# Patient Record
Sex: FEMALE | Race: WHITE | NOT HISPANIC OR LATINO | URBAN - METROPOLITAN AREA
[De-identification: names, ages, dates, MRNs, and addresses within clinical notes are randomized per-mention and may not be internally consistent; named-entity substitution may affect disease eponyms.]

---

## 2019-11-27 ENCOUNTER — OFFICE VISIT (OUTPATIENT)
Dept: FAMILY MEDICINE CLINIC | Facility: CLINIC | Age: 34
End: 2019-11-27
Payer: COMMERCIAL

## 2019-11-27 VITALS
RESPIRATION RATE: 20 BRPM | BODY MASS INDEX: 24.11 KG/M2 | HEART RATE: 86 BPM | HEIGHT: 66 IN | DIASTOLIC BLOOD PRESSURE: 70 MMHG | WEIGHT: 150 LBS | SYSTOLIC BLOOD PRESSURE: 106 MMHG | OXYGEN SATURATION: 99 % | TEMPERATURE: 97.9 F

## 2019-11-27 DIAGNOSIS — Z23 NEEDS FLU SHOT: ICD-10-CM

## 2019-11-27 DIAGNOSIS — Z00.00 ANNUAL PHYSICAL EXAM: Primary | ICD-10-CM

## 2019-11-27 PROCEDURE — 90686 IIV4 VACC NO PRSV 0.5 ML IM: CPT

## 2019-11-27 PROCEDURE — 90471 IMMUNIZATION ADMIN: CPT

## 2019-11-27 PROCEDURE — 99385 PREV VISIT NEW AGE 18-39: CPT | Performed by: NURSE PRACTITIONER

## 2019-11-27 NOTE — PROGRESS NOTES
HPI:  Danilo Burgos is a 29 y o  female here for her yearly health maintenance exam    There is no problem list on file for this patient  History reviewed  No pertinent past medical history  1  Advanced Directive: n/a     2  Durable Power of  for Healthcare: n/a     3  Social History:               Marital History:               Work Status: stay at home,               Drug and alcohol History: none              Alcohol Use: social     4  General Health: good health              Regular Dental Visits:yes              Vision problems:none              Hearing Loss:none              Immunizations up to date: UTD                 Lifestyle:                           Healthy Diet:yes                          Tobacco Use:none                          Regular exercise:yes                          Weight concerns:none                           PHQ-9 Depression Screening    PHQ-9:    Frequency of the following problems over the past two weeks:       Little interest or pleasure in doing things:  0 - not at all  Feeling down, depressed, or hopeless:  0 - not at all  PHQ-2 Score:  0           No current outpatient medications on file  No current facility-administered medications for this visit  No Known Allergies  Immunization History   Administered Date(s) Administered    DTaP 5 07/27/2014    INFLUENZA 11/29/2007, 10/06/2010, 12/07/2011, 11/26/2014, 10/05/2016, 09/27/2017, 11/04/2018    Influenza, injectable, quadrivalent, preservative free 0 5 mL 11/27/2019    Tdap 03/01/2008, 11/04/2010, 09/13/2017, 07/22/2019       Patient Care Team:  Malka Bosworth as PCP - General (Family Medicine)    Review of Systems   Constitutional: Negative for activity change, diaphoresis, fatigue and fever  HENT: Negative for congestion, facial swelling, hearing loss, rhinorrhea, sinus pressure, sinus pain, sneezing, sore throat and voice change      Eyes: Negative for discharge and visual disturbance  Respiratory: Negative for cough, choking, chest tightness, shortness of breath, wheezing and stridor  Cardiovascular: Negative for chest pain, palpitations and leg swelling  Gastrointestinal: Negative for abdominal distention, abdominal pain, constipation, diarrhea, nausea and vomiting  Endocrine: Negative for polydipsia, polyphagia and polyuria  Genitourinary: Negative for difficulty urinating, dysuria, frequency and urgency  Musculoskeletal: Negative for arthralgias, back pain, gait problem, joint swelling, myalgias, neck pain and neck stiffness  Skin: Negative for color change, rash and wound  Neurological: Negative for dizziness, syncope, speech difficulty, weakness, light-headedness and headaches  Hematological: Negative for adenopathy  Does not bruise/bleed easily  Psychiatric/Behavioral: Negative for agitation, behavioral problems, confusion, hallucinations, sleep disturbance and suicidal ideas  The patient is not nervous/anxious  Physical Exam :  Physical Exam   Constitutional: She is oriented to person, place, and time  She appears well-developed and well-nourished  No distress  HENT:   Head: Normocephalic and atraumatic  Right Ear: External ear normal    Left Ear: External ear normal    Nose: Nose normal    Mouth/Throat: Uvula is midline, oropharynx is clear and moist and mucous membranes are normal    Eyes: Pupils are equal, round, and reactive to light  Conjunctivae and EOM are normal  Right eye exhibits no discharge  Left eye exhibits no discharge  Neck: Normal range of motion  Cardiovascular: Normal rate, regular rhythm and normal heart sounds  Exam reveals no gallop and no friction rub  No murmur heard  Pulmonary/Chest: Effort normal and breath sounds normal  No respiratory distress  She has no wheezes  She has no rales  Musculoskeletal: Normal range of motion  She exhibits no edema, tenderness or deformity     Neurological: She is alert and oriented to person, place, and time  No cranial nerve deficit  Coordination normal    Skin: Skin is warm and dry  No rash noted  She is not diaphoretic  No erythema  Psychiatric: She has a normal mood and affect  Her behavior is normal  Judgment and thought content normal    Nursing note and vitals reviewed  Reviewed Updated [facility] Visits:   Last Health Maintenance visit information was reviewed, patient interviewed , no change since last HM visit no  Last HM visit information was reviewed, patient interviewed and updates made to the record today no    Assessment and Plan:  1  Annual physical exam     2   Needs flu shot  influenza vaccine, [id number], quadrivalent, 0 5 mL, preservative-free, for adult and pediatric patients 6 mos+ (AFLURIA, FLUARIX, FLULAVAL, FLUZONE)       Health Maintenance Due   Topic Date Due    Depression Screening PHQ  1985    HIV Screening  07/30/2000    BMI: Adult  07/30/2003    Cervical Cancer Screening  07/30/2006    Influenza Vaccine  07/01/2019      PHQ-9 Depression Screening    PHQ-9:    Frequency of the following problems over the past two weeks:       Little interest or pleasure in doing things:  0 - not at all  Feeling down, depressed, or hopeless:  0 - not at all  PHQ-2 Score:  0

## 2020-10-26 DIAGNOSIS — Z20.828 EXPOSURE TO SARS-ASSOCIATED CORONAVIRUS: ICD-10-CM

## 2020-10-26 DIAGNOSIS — Z20.828 EXPOSURE TO SARS-ASSOCIATED CORONAVIRUS: Primary | ICD-10-CM

## 2020-10-26 PROCEDURE — U0003 INFECTIOUS AGENT DETECTION BY NUCLEIC ACID (DNA OR RNA); SEVERE ACUTE RESPIRATORY SYNDROME CORONAVIRUS 2 (SARS-COV-2) (CORONAVIRUS DISEASE [COVID-19]), AMPLIFIED PROBE TECHNIQUE, MAKING USE OF HIGH THROUGHPUT TECHNOLOGIES AS DESCRIBED BY CMS-2020-01-R: HCPCS | Performed by: NURSE PRACTITIONER

## 2020-10-28 LAB — SARS-COV-2 RNA SPEC QL NAA+PROBE: NOT DETECTED

## 2021-06-24 ENCOUNTER — TELEPHONE (OUTPATIENT)
Dept: FAMILY MEDICINE CLINIC | Facility: CLINIC | Age: 36
End: 2021-06-24

## 2021-10-15 DIAGNOSIS — Z20.822 EXPOSURE TO COVID-19 VIRUS: Primary | ICD-10-CM

## 2021-10-15 PROCEDURE — U0003 INFECTIOUS AGENT DETECTION BY NUCLEIC ACID (DNA OR RNA); SEVERE ACUTE RESPIRATORY SYNDROME CORONAVIRUS 2 (SARS-COV-2) (CORONAVIRUS DISEASE [COVID-19]), AMPLIFIED PROBE TECHNIQUE, MAKING USE OF HIGH THROUGHPUT TECHNOLOGIES AS DESCRIBED BY CMS-2020-01-R: HCPCS | Performed by: NURSE PRACTITIONER

## 2021-10-15 PROCEDURE — U0005 INFEC AGEN DETEC AMPLI PROBE: HCPCS | Performed by: NURSE PRACTITIONER

## 2021-10-16 LAB — SARS-COV-2 RNA RESP QL NAA+PROBE: NEGATIVE

## 2022-08-26 ENCOUNTER — OFFICE VISIT (OUTPATIENT)
Dept: FAMILY MEDICINE CLINIC | Facility: CLINIC | Age: 37
End: 2022-08-26
Payer: COMMERCIAL

## 2022-08-26 VITALS
BODY MASS INDEX: 26.97 KG/M2 | TEMPERATURE: 99.2 F | DIASTOLIC BLOOD PRESSURE: 72 MMHG | SYSTOLIC BLOOD PRESSURE: 116 MMHG | HEART RATE: 79 BPM | OXYGEN SATURATION: 99 % | WEIGHT: 167.8 LBS | HEIGHT: 66 IN | RESPIRATION RATE: 20 BRPM

## 2022-08-26 DIAGNOSIS — Z00.00 ANNUAL PHYSICAL EXAM: Primary | ICD-10-CM

## 2022-08-26 DIAGNOSIS — R53.83 OTHER FATIGUE: ICD-10-CM

## 2022-08-26 DIAGNOSIS — E66.3 OVERWEIGHT WITH BODY MASS INDEX (BMI) OF 27 TO 27.9 IN ADULT: ICD-10-CM

## 2022-08-26 DIAGNOSIS — Z13.220 SCREENING CHOLESTEROL LEVEL: ICD-10-CM

## 2022-08-26 PROCEDURE — 3725F SCREEN DEPRESSION PERFORMED: CPT | Performed by: NURSE PRACTITIONER

## 2022-08-26 PROCEDURE — 99395 PREV VISIT EST AGE 18-39: CPT | Performed by: NURSE PRACTITIONER

## 2022-08-26 NOTE — PROGRESS NOTES
HPI:  Frida Solano is a 40 y o  female here for her yearly health maintenance exam    There is no problem list on file for this patient  History reviewed  No pertinent past medical history  1  Advanced Directive: na     2  Durable Power of  for Healthcare: na     3  Social History:               Marital History: - [place]              Work Status:               Drug and alcohol History: no              Alcohol Use: monthly     4  General Health: good              Regular Dental Visits:yes              Vision problems:none              Hearing Loss:none              Immunizations up to date: UTD                 Lifestyle:                           Healthy Diet:limited junk food                          [address]                          Regular exercise:tries to get cardio 3x/week                                PHQ-2/9 Depression Screening    Little interest or pleasure in doing things: 0 - not at all  Feeling down, depressed, or hopeless: 0 - not at all  PHQ-2 Score: 0  PHQ-2 Interpretation: Negative depression screen           No current outpatient medications on file  No current facility-administered medications for this visit  No Known Allergies  Immunization History   Administered Date(s) Administered    COVID-19 PFIZER VACCINE 0 3 ML IM 04/28/2021, 05/19/2021    DTaP 5 07/27/2014    INFLUENZA 11/29/2007, 10/06/2010, 12/07/2011, 11/26/2014, 10/05/2016, 09/27/2017, 11/04/2018, 11/27/2019, 10/24/2020    Influenza, injectable, quadrivalent, preservative free 0 5 mL 11/27/2019    Tdap 03/01/2008, 11/04/2010, 07/03/2017, 09/13/2017, 07/22/2019       Patient Care Team:  Heraclio Bradford as PCP - General (Family Medicine)  Heraclio Bradford as PCP - [facility]CP-[facility] Attributed-Roster    Review of Systems   Constitutional: Positive for activity change, fatigue and unexpected weight change  Negative for diaphoresis and fever     HENT: Negative for congestion, facial swelling, hearing loss, rhinorrhea, sinus pressure, sinus pain, sneezing, sore throat and voice change  Eyes: Negative for discharge and visual disturbance  Respiratory: Negative for cough, choking, chest tightness, shortness of breath, wheezing and stridor  Cardiovascular: Negative for chest pain, palpitations and leg swelling  Gastrointestinal: Negative for abdominal distention, abdominal pain, constipation, diarrhea, nausea and vomiting  Endocrine: Negative for polydipsia, polyphagia and polyuria  Genitourinary: Negative for difficulty urinating, dysuria, frequency and urgency  Musculoskeletal: Negative for arthralgias, back pain, gait problem, joint swelling, myalgias, neck pain and neck stiffness  Skin: Negative for color change, rash and wound  Neurological: Negative for dizziness, syncope, speech difficulty, weakness, light-headedness and headaches  Hematological: Negative for adenopathy  Does not bruise/bleed easily  Psychiatric/Behavioral: Negative for agitation, behavioral problems, confusion, hallucinations, sleep disturbance and suicidal ideas  The patient is not nervous/anxious  Physical Exam :  Physical Exam  Vitals and nursing note reviewed  Constitutional:       General: She is not in acute distress  Appearance: She is well-developed  She is not diaphoretic  HENT:      Head: Normocephalic and atraumatic  Right Ear: Tympanic membrane, ear canal and external ear normal       Left Ear: Tympanic membrane, ear canal and external ear normal       Nose: Nose normal       Right Sinus: No maxillary sinus tenderness or frontal sinus tenderness  Left Sinus: No maxillary sinus tenderness or frontal sinus tenderness  Mouth/Throat:      Mouth: Mucous membranes are moist       Pharynx: Oropharynx is clear  Uvula midline  No oropharyngeal exudate  Eyes:      General:         Right eye: No discharge  Left eye: No discharge  Conjunctiva/sclera: Conjunctivae normal       Pupils: Pupils are equal, round, and reactive to light  Neck:      Thyroid: No thyromegaly  Trachea: No tracheal deviation  Cardiovascular:      Rate and Rhythm: Normal rate and regular rhythm  Heart sounds: Normal heart sounds  No murmur heard  No friction rub  No gallop  Pulmonary:      Effort: Pulmonary effort is normal  No respiratory distress  Breath sounds: Normal breath sounds  No wheezing or rales  Abdominal:      General: Bowel sounds are normal  There is no distension  Palpations: Abdomen is soft  There is no mass  Tenderness: There is no abdominal tenderness  There is no guarding or rebound  Musculoskeletal:         General: No tenderness or deformity  Normal range of motion  Cervical back: Normal range of motion and neck supple  Lymphadenopathy:      Cervical: No cervical adenopathy  Skin:     General: Skin is warm and dry  Findings: No erythema or rash  Neurological:      Mental Status: She is alert and oriented to person, place, and time  Cranial Nerves: No cranial nerve deficit  Coordination: Coordination normal    Psychiatric:         Mood and Affect: Mood normal          Speech: Speech normal          Behavior: Behavior normal  Behavior is cooperative  Thought Content: Thought content normal          Judgment: Judgment normal        BMI Counseling: Body mass index is 27 08 kg/m²  The BMI is above normal  Nutrition recommendations include decreasing portion sizes, encouraging healthy choices of fruits and vegetables, decreasing fast food intake, consuming healthier snacks, limiting drinks that contain sugar, moderation in carbohydrate intake and increasing intake of lean protein  Exercise recommendations include exercising 3-5 times per week  Rationale for BMI follow-up plan is due to patient being overweight or obese       Depression Screening and Follow-up Plan: Patient was screened for depression during today's encounter  They screened negative with a PHQ-2 score of 0  Reviewed Updated [facility] Visits:   Last Health Maintenance visit information was reviewed, patient interviewed , no change since last HM visit yes  Last HM visit information was reviewed, patient interviewed and updates made to the record today yes    Assessment and Plan:  1  Annual physical exam  CBC and differential    Comprehensive metabolic panel   2  Other fatigue  Iron Panel (Includes Ferritin, Iron Sat%, Iron, and TIBC)    DHEA    Cortisol Level, AM Specimen   3  Screening cholesterol level  Lipid panel   4   Overweight with body mass index (BMI) of 27 to 27 9 in adult         Health Maintenance Due   Topic Date Due    Hepatitis C Screening  Never done    HIV Screening  Never done    BMI: Followup Plan  Never done    Cervical Cancer Screening  Never done    Annual Physical  11/27/2020    COVID-19 Vaccine (3 - Booster for Pfizer series) 10/19/2021    Influenza Vaccine (1) 09/01/2022

## 2023-09-11 ENCOUNTER — OFFICE VISIT (OUTPATIENT)
Dept: FAMILY MEDICINE CLINIC | Facility: CLINIC | Age: 38
End: 2023-09-11
Payer: COMMERCIAL

## 2023-09-11 VITALS
SYSTOLIC BLOOD PRESSURE: 118 MMHG | WEIGHT: 164 LBS | BODY MASS INDEX: 26.36 KG/M2 | TEMPERATURE: 98 F | RESPIRATION RATE: 20 BRPM | DIASTOLIC BLOOD PRESSURE: 70 MMHG | HEART RATE: 88 BPM | HEIGHT: 66 IN | OXYGEN SATURATION: 99 %

## 2023-09-11 DIAGNOSIS — Z00.00 ANNUAL PHYSICAL EXAM: Primary | ICD-10-CM

## 2023-09-11 DIAGNOSIS — Z13.220 SCREENING CHOLESTEROL LEVEL: ICD-10-CM

## 2023-09-11 PROCEDURE — 99395 PREV VISIT EST AGE 18-39: CPT | Performed by: NURSE PRACTITIONER

## 2023-09-11 NOTE — PROGRESS NOTES
HPI:  Jurgen Agrawal is a 45 y.o. female here for her yearly health maintenance exam.   There is no problem list on file for this patient. No past medical history on file. 1. Advanced Directive: n/a     2. Durable Power of  for Healthcare: n/a     3. 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 General Health: good              Regular Dental Visits: regular               Vision problems:none              Hearing Loss: none              Immunizations up to date: up to date                  Lifestyle:                           Healthy Diet: healthy eating                           Tobacco Use:none                          Regular exercise: running after kids                           Weight concerns: none        PHQ-2/9 Depression Screening    Little interest or pleasure in doing things: 0 - not at all  Feeling down, depressed, or hopeless: 0 - not at all  PHQ-2 Score: 0  PHQ-2 Interpretation: Negative depression screen           No current outpatient medications on file. No current facility-administered medications for this visit. No Known Allergies  Immunization History   Administered Date(s) Administered   • COVID-19 PFIZER VACCINE 0.3 ML IM 04/28/2021, 05/19/2021   • DTaP 5 07/27/2014   • INFLUENZA 11/29/2007, 10/06/2010, 12/07/2011, 11/26/2014, 10/05/2016, 09/27/2017, 11/04/2018, 11/27/2019, 10/24/2020   • Influenza, injectable, quadrivalent, preservative free 0.5 mL 11/27/2019   • Tdap 03/01/2008, 11/04/2010, 07/03/2017, 09/13/2017, 07/22/2019       Patient Care Team:  Judy Quan as PCP - General (Family Medicine)  Judy Quan as PCP - PCP-[facility] Attributed-Roster    Review of Systems   Constitutional: Negative for activity change, diaphoresis, fatigue and fever.    HENT: Negative for congestion, facial swelling, hearing loss, rhinorrhea, sinus pressure, sinus pain, sneezing, sore throat and voice change. Eyes: Negative for discharge and visual disturbance. Respiratory: Negative for cough, choking, chest tightness, shortness of breath, wheezing and stridor. Cardiovascular: Negative for chest pain, palpitations and leg swelling. Gastrointestinal: Negative for abdominal distention, abdominal pain, constipation, diarrhea, nausea and vomiting. Endocrine: Negative for polydipsia, polyphagia and polyuria. Genitourinary: Negative for difficulty urinating, dysuria, frequency and urgency. Musculoskeletal: Negative for arthralgias, back pain, gait problem, joint swelling, myalgias, neck pain and neck stiffness. Skin: Negative for color change, rash and wound. Neurological: Negative for dizziness, syncope, speech difficulty, weakness, light-headedness and headaches. Hematological: Negative for adenopathy. Does not bruise/bleed easily. Psychiatric/Behavioral: Negative for agitation, behavioral problems, confusion, hallucinations, sleep disturbance and suicidal ideas. The patient is not nervous/anxious. Physical Exam :  Physical Exam  Vitals and nursing note reviewed. Constitutional:       General: She is not in acute distress. Appearance: Normal appearance. She is well-developed. She is not diaphoretic. HENT:      Head: Normocephalic and atraumatic. Right Ear: Tympanic membrane, ear canal and external ear normal.      Left Ear: Tympanic membrane, ear canal and external ear normal.      Nose: Nose normal.      Right Sinus: No maxillary sinus tenderness or frontal sinus tenderness. Left Sinus: No maxillary sinus tenderness or frontal sinus tenderness. Mouth/Throat:      Mouth: Mucous membranes are moist.      Pharynx: Oropharynx is clear. Uvula midline. No oropharyngeal exudate. Eyes:      General:         Right eye: No discharge. Left eye: No discharge. Conjunctiva/sclera: Conjunctivae normal.      Pupils: Pupils are equal, round, and reactive to light. Neck:      Thyroid: No thyromegaly. Trachea: No tracheal deviation. Cardiovascular:      Rate and Rhythm: Normal rate and regular rhythm. Heart sounds: Normal heart sounds. No murmur heard. No friction rub. No gallop. Pulmonary:      Effort: Pulmonary effort is normal. No respiratory distress. Breath sounds: Normal breath sounds. No wheezing or rales. Abdominal:      General: Bowel sounds are normal. There is no distension. Palpations: Abdomen is soft. There is no mass. Tenderness: There is no abdominal tenderness. There is no guarding or rebound. Musculoskeletal:         General: No tenderness or deformity. Normal range of motion. Cervical back: Normal range of motion and neck supple. Lymphadenopathy:      Cervical: No cervical adenopathy. Skin:     General: Skin is warm and dry. Findings: No erythema or rash. Neurological:      Mental Status: She is alert and oriented to person, place, and time. Cranial Nerves: No cranial nerve deficit. Coordination: Coordination normal.   Psychiatric:         Mood and Affect: Mood normal.         Speech: Speech normal.         Behavior: Behavior normal.         Thought Content: Thought content normal.         Judgment: Judgment normal.       BMI Counseling: Body mass index is 26.47 kg/m². The BMI is above normal. Nutrition recommendations include decreasing portion sizes, encouraging healthy choices of fruits and vegetables, decreasing fast food intake, consuming healthier snacks, limiting drinks that contain sugar, moderation in carbohydrate intake and increasing intake of lean protein. Exercise recommendations include exercising 3-5 times per week. Rationale for BMI follow-up plan is due to patient being overweight or obese.      Depression Screening and Follow-up Plan: Patient was screened for depression during today's encounter. They screened negative with a PHQ-2 score of 0. Reviewed Updated [facility] Visits:   Last Health Maintenance visit information was reviewed, patient interviewed , no change since last HM visit yes  Last HM visit information was reviewed, patient interviewed and updates made to the record today yes    Assessment and Plan:  1. Annual physical exam  CBC and differential    Comprehensive metabolic panel    TSH, 3rd generation with Free T4 reflex      2.  Screening cholesterol level  Lipid panel          Health Maintenance Due   Topic Date Due   • Hepatitis C Screening  Never done   • HIV Screening  Never done   • Cervical Cancer Screening  Never done   • COVID-19 Vaccine (3 - Pfizer series) 07/14/2021   • BMI: Followup Plan  08/26/2023   • Annual Physical  08/26/2023   • Influenza Vaccine (1) 09/01/2023

## 2024-03-29 ENCOUNTER — APPOINTMENT (OUTPATIENT)
Dept: LAB | Facility: CLINIC | Age: 39
End: 2024-03-29
Payer: COMMERCIAL

## 2024-03-29 DIAGNOSIS — Z00.00 ANNUAL PHYSICAL EXAM: ICD-10-CM

## 2024-03-29 DIAGNOSIS — Z13.220 SCREENING CHOLESTEROL LEVEL: ICD-10-CM

## 2024-03-29 LAB
ALBUMIN SERPL BCP-MCNC: 4.1 G/DL (ref 3.5–5)
ALP SERPL-CCNC: 46 U/L (ref 34–104)
ALT SERPL W P-5'-P-CCNC: 9 U/L (ref 7–52)
ANION GAP SERPL CALCULATED.3IONS-SCNC: 9 MMOL/L (ref 4–13)
AST SERPL W P-5'-P-CCNC: 15 U/L (ref 13–39)
BASOPHILS # BLD AUTO: 0.09 THOUSANDS/ÂΜL (ref 0–0.1)
BASOPHILS NFR BLD AUTO: 1 % (ref 0–1)
BILIRUB SERPL-MCNC: 0.47 MG/DL (ref 0.2–1)
BUN SERPL-MCNC: 9 MG/DL (ref 5–25)
CALCIUM SERPL-MCNC: 8.7 MG/DL (ref 8.4–10.2)
CHLORIDE SERPL-SCNC: 103 MMOL/L (ref 96–108)
CHOLEST SERPL-MCNC: 169 MG/DL
CO2 SERPL-SCNC: 26 MMOL/L (ref 21–32)
CREAT SERPL-MCNC: 0.63 MG/DL (ref 0.6–1.3)
EOSINOPHIL # BLD AUTO: 0.2 THOUSAND/ÂΜL (ref 0–0.61)
EOSINOPHIL NFR BLD AUTO: 3 % (ref 0–6)
ERYTHROCYTE [DISTWIDTH] IN BLOOD BY AUTOMATED COUNT: 12.5 % (ref 11.6–15.1)
GFR SERPL CREATININE-BSD FRML MDRD: 114 ML/MIN/1.73SQ M
GLUCOSE P FAST SERPL-MCNC: 88 MG/DL (ref 65–99)
HCT VFR BLD AUTO: 39.1 % (ref 34.8–46.1)
HDLC SERPL-MCNC: 64 MG/DL
HGB BLD-MCNC: 12.8 G/DL (ref 11.5–15.4)
IMM GRANULOCYTES # BLD AUTO: 0.02 THOUSAND/UL (ref 0–0.2)
IMM GRANULOCYTES NFR BLD AUTO: 0 % (ref 0–2)
LDLC SERPL CALC-MCNC: 96 MG/DL (ref 0–100)
LYMPHOCYTES # BLD AUTO: 2.64 THOUSANDS/ÂΜL (ref 0.6–4.47)
LYMPHOCYTES NFR BLD AUTO: 36 % (ref 14–44)
MCH RBC QN AUTO: 31.1 PG (ref 26.8–34.3)
MCHC RBC AUTO-ENTMCNC: 32.7 G/DL (ref 31.4–37.4)
MCV RBC AUTO: 95 FL (ref 82–98)
MONOCYTES # BLD AUTO: 0.48 THOUSAND/ÂΜL (ref 0.17–1.22)
MONOCYTES NFR BLD AUTO: 7 % (ref 4–12)
NEUTROPHILS # BLD AUTO: 3.97 THOUSANDS/ÂΜL (ref 1.85–7.62)
NEUTS SEG NFR BLD AUTO: 53 % (ref 43–75)
NONHDLC SERPL-MCNC: 105 MG/DL
NRBC BLD AUTO-RTO: 0 /100 WBCS
PLATELET # BLD AUTO: 305 THOUSANDS/UL (ref 149–390)
PMV BLD AUTO: 10.8 FL (ref 8.9–12.7)
POTASSIUM SERPL-SCNC: 4.3 MMOL/L (ref 3.5–5.3)
PROT SERPL-MCNC: 6.6 G/DL (ref 6.4–8.4)
RBC # BLD AUTO: 4.11 MILLION/UL (ref 3.81–5.12)
SODIUM SERPL-SCNC: 138 MMOL/L (ref 135–147)
TRIGL SERPL-MCNC: 47 MG/DL
TSH SERPL DL<=0.05 MIU/L-ACNC: 1.01 UIU/ML (ref 0.45–4.5)
WBC # BLD AUTO: 7.4 THOUSAND/UL (ref 4.31–10.16)

## 2024-03-29 PROCEDURE — 80061 LIPID PANEL: CPT

## 2024-03-29 PROCEDURE — 36415 COLL VENOUS BLD VENIPUNCTURE: CPT

## 2024-03-29 PROCEDURE — 80053 COMPREHEN METABOLIC PANEL: CPT

## 2024-03-29 PROCEDURE — 85025 COMPLETE CBC W/AUTO DIFF WBC: CPT

## 2024-03-29 PROCEDURE — 84443 ASSAY THYROID STIM HORMONE: CPT

## 2024-05-16 ENCOUNTER — OFFICE VISIT (OUTPATIENT)
Dept: FAMILY MEDICINE CLINIC | Facility: CLINIC | Age: 39
End: 2024-05-16
Payer: COMMERCIAL

## 2024-05-16 VITALS
BODY MASS INDEX: 25.99 KG/M2 | OXYGEN SATURATION: 98 % | HEART RATE: 94 BPM | WEIGHT: 161 LBS | SYSTOLIC BLOOD PRESSURE: 122 MMHG | TEMPERATURE: 98.1 F | DIASTOLIC BLOOD PRESSURE: 80 MMHG

## 2024-05-16 DIAGNOSIS — J01.11 ACUTE RECURRENT FRONTAL SINUSITIS: Primary | ICD-10-CM

## 2024-05-16 PROCEDURE — 99213 OFFICE O/P EST LOW 20 MIN: CPT | Performed by: NURSE PRACTITIONER

## 2024-05-16 RX ORDER — FLUTICASONE PROPIONATE 50 MCG
1 SPRAY, SUSPENSION (ML) NASAL AS NEEDED
COMMUNITY

## 2024-05-16 RX ORDER — PREDNISONE 20 MG/1
20 TABLET ORAL 2 TIMES DAILY WITH MEALS
Qty: 10 TABLET | Refills: 0 | Status: SHIPPED | OUTPATIENT
Start: 2024-05-16 | End: 2024-05-21

## 2024-05-16 RX ORDER — AZITHROMYCIN 250 MG/1
TABLET, FILM COATED ORAL
Qty: 6 TABLET | Refills: 0 | Status: SHIPPED | OUTPATIENT
Start: 2024-05-16 | End: 2024-05-20

## 2024-05-16 RX ORDER — CETIRIZINE HYDROCHLORIDE 10 MG/1
10 TABLET ORAL DAILY
COMMUNITY

## 2024-05-16 NOTE — PROGRESS NOTES
Ambulatory Visit  Name: Abbie Cornelius      : 1985      MRN: [id number]  Encounter Provider: RICH Mandujano  Encounter Date: 2024   Encounter department: [facility] PRIMARY [facility]    Assessment & Plan   1. Acute recurrent frontal sinusitis  -     azithromycin (ZITHROMAX) 250 mg tablet; Take 2 tablets today then 1 tablet daily x 4 days  -     predniSONE 20 mg tablet; Take 1 tablet (20 mg total) by mouth 2 (two) times a day with meals for 5 days         History of Present Illness     Here for sore throat, left sided facial pain with headache, dizziness      Review of Systems   Constitutional:  Negative for activity change, diaphoresis, fatigue and fever.   HENT:  Positive for congestion, ear pain, rhinorrhea, sinus pressure, sinus pain and sore throat. Negative for facial swelling, hearing loss, sneezing and voice change.    Eyes:  Positive for visual disturbance (seen by eye doctor- told hse has an astigmatism). Negative for discharge.   Respiratory:  Negative for cough, choking, chest tightness, shortness of breath, wheezing and stridor.    Cardiovascular:  Negative for chest pain, palpitations and leg swelling.   Gastrointestinal:  Negative for abdominal distention, abdominal pain, constipation, diarrhea, nausea and vomiting.   Endocrine: Negative for polydipsia, polyphagia and polyuria.   Genitourinary:  Negative for difficulty urinating, dysuria, frequency and urgency.   Musculoskeletal:  Negative for arthralgias, back pain, gait problem, joint swelling, myalgias, neck pain and neck stiffness.   Skin:  Negative for color change, rash and wound.   Neurological:  Positive for dizziness, light-headedness and headaches. Negative for syncope, speech difficulty and weakness.   Hematological:  Negative for adenopathy. Does not bruise/bleed easily.   Psychiatric/Behavioral:  Negative for agitation, behavioral problems, confusion, hallucinations, sleep disturbance and suicidal ideas. The patient  is not nervous/anxious.      Past Medical History:   Diagnosis Date    Migraine      Past Surgical History:   Procedure Laterality Date    WISDOM TOOTH EXTRACTION       History reviewed. No pertinent family history.  Social History     Tobacco Use    Smoking status: Never    Smokeless tobacco: Never   Substance and Sexual Activity    Alcohol use: Yes    Drug use: Never    Sexual activity: Not on file     Current Outpatient Medications on File Prior to Visit   Medication Sig    cetirizine (ZyrTEC) 10 mg tablet Take 10 mg by mouth daily    fluticasone (FLONASE) 50 mcg/act nasal spray 1 spray into each nostril if needed for rhinitis     No Known Allergies  Immunization History   Administered Date(s) Administered    COVID-19 PFIZER VACCINE 0.3 ML IM 04/28/2021, 05/19/2021, 12/05/2021    DTaP 5 07/27/2014    INFLUENZA 11/29/2007, 10/06/2010, 12/07/2011, 11/26/2014, 10/05/2016, 09/27/2017, 11/04/2018, 11/27/2019, 10/24/2020    Influenza, injectable, quadrivalent, preservative free 0.5 mL 11/27/2019    Tdap 03/01/2008, 11/04/2010, 07/03/2017, 09/13/2017, 07/22/2019     Objective     /80   Pulse 94   Temp 98.1 °F (36.7 °C) (Tympanic)   Wt 73 kg (161 lb)   SpO2 98%   BMI 25.99 kg/m²     Physical Exam  Vitals and nursing note reviewed.   Constitutional:       General: She is not in acute distress.     Appearance: She is well-developed. She is ill-appearing (mild). She is not diaphoretic.   HENT:      Right Ear: Tympanic membrane, ear canal and external ear normal. No tenderness. No middle ear effusion.      Left Ear: Tympanic membrane, ear canal and external ear normal. No tenderness.  No middle ear effusion.      Nose: Congestion present.      Mouth/Throat:      Mouth: Mucous membranes are moist.      Pharynx: Oropharyngeal exudate present. No posterior oropharyngeal erythema.   Eyes:      General:         Right eye: No discharge.         Left eye: No discharge.   Cardiovascular:      Rate and Rhythm: Normal rate  and regular rhythm.      Heart sounds: Normal heart sounds. No murmur heard.  Pulmonary:      Effort: Pulmonary effort is normal. No respiratory distress.      Breath sounds: Normal breath sounds. No wheezing.   Musculoskeletal:         General: Normal range of motion.      Cervical back: Normal range of motion and neck supple.   Lymphadenopathy:      Cervical: Cervical adenopathy present.   Skin:     General: Skin is warm and dry.      Findings: No erythema or rash.   Neurological:      Mental Status: She is alert and oriented to person, place, and time.   Psychiatric:         Mood and Affect: Mood and affect and mood normal.         Behavior: Behavior normal.         Thought Content: Thought content normal.         Judgment: Judgment normal.       Administrative Statements